# Patient Record
(demographics unavailable — no encounter records)

---

## 2024-12-13 NOTE — HISTORY OF PRESENT ILLNESS
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 Reports on 10/19/2024 she felt a lump on her left side of the neck she saw doctors in [facility] that did 2 biopsies and results came back inconclusive (prsumed due to tumor necrosis) . She then travelled to [place] and met with Bethany Valerio who also did further test which demonstrated cancer. 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  Office bx done  Denies pain, dysphagia,  odynophagia, dysphonia and or dyspnea. weight stable, eating ok. 11/7/2024 left neck mass biopsy Flow cytometry hypocellular specimen 11/27/2024 left tonsil scca with papillary and basaloid features. P16 report pending 11/20/2024 PET/CT scan reporting FDG avid left palatine tonsil mass, estimated 3.ocm. FDG avid ipsilateral left level II and  III lymph nodes. No evidence of hepatic, pulmonary or osseous metastasis Denies cardiac or lung issues. Not on any blood thinners.  non smoker, never. Occasional alcohol intake. no wt loss.  good appetite.  Eats all foods.

## 2024-12-13 NOTE — CONSULT LETTER
[Dear  ___] : Dear  [unfilled], [Consult Letter:] : I had the pleasure of evaluating your patient, [unfilled]. [Please see my note below.] : Please see my note below. [Consult Closing:] : Thank you very much for allowing me to participate in the care of this patient.  If you have any questions, please do not hesitate to contact me. [Sincerely,] : Sincerely, [FreeTextEntry2] : Thien Atkins MD ( [place], [place]) [FreeTextEntry3] : Jv Harmon MD, FACS     [facility] Associate Chair    Department of Otolaryngology  Professor Otolaryngology & Molecular Medicine [facility] of [facility]

## 2024-12-13 NOTE — CONSULT LETTER
[Dear  ___] : Dear  [unfilled], [Consult Letter:] : I had the pleasure of evaluating your patient, [unfilled]. [Please see my note below.] : Please see my note below. [Consult Closing:] : Thank you very much for allowing me to participate in the care of this patient.  If you have any questions, please do not hesitate to contact me. [Sincerely,] : Sincerely, [FreeTextEntry2] : Thien Atkins MD ( [place], [place]) [FreeTextEntry3] : Jv Harmon MD, FACS     [facility] Associate Chair    Department of Otolaryngology  Professor Otolaryngology & Molecular Medicine [facility]

## 2024-12-13 NOTE — HISTORY OF PRESENT ILLNESS
[de-identified] : 76yo female who is being referred by Dr. Atkins for tonsil cancer. Reports on 10/19/2024 she felt a lump on her left side of the neck she saw doctors in [facility] that did 2 biopsies and results came back inconclusive (prsumed due to tumor necrosis) . She then travelled to [place] and met with Bethany Valerio who also did further test which demonstrated cancer. 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  Office bx done  Denies pain, dysphagia,  odynophagia, dysphonia and or dyspnea. weight stable, eating ok. 11/7/2024 left neck mass biopsy Flow cytometry hypocellular specimen 11/27/2024 left tonsil scca with papillary and basaloid features. P16 report pending 11/20/2024 PET/CT scan reporting FDG avid left palatine tonsil mass, estimated 3.ocm. FDG avid ipsilateral left level II and  III lymph nodes. No evidence of hepatic, pulmonary or osseous metastasis Denies cardiac or lung issues. Not on any blood thinners.  non smoker, never. Occasional alcohol intake. no wt loss.  good appetite.  Eats all foods.

## 2024-12-13 NOTE — PHYSICAL EXAM
[Normal] : no rashes [de-identified] : no celar palp mass [FreeTextEntry1] : L tonsillar mass, fairly mobile.  no BOT extension. no clear SP extension.

## 2024-12-13 NOTE — PROCEDURE
[Lesion] : lesion identified by mirror examination needing further evaluation [None] : none [Flexible Endoscope] : examined with the flexible endoscope [Normal] : normal [de-identified] : bot seems clear.  larynx clear

## 2024-12-13 NOTE — PHYSICAL EXAM
[Normal] : no rashes [de-identified] : no celar palp mass [FreeTextEntry1] : L tonsillar mass, fairly mobile.  no BOT extension. no clear SP extension.

## 2024-12-13 NOTE — PHYSICAL EXAM
[Normal] : no rashes [de-identified] : no celar palp mass [FreeTextEntry1] : L tonsillar mass, fairly mobile.  no BOT extension. no clear SP extension.

## 2024-12-13 NOTE — PROCEDURE
[Lesion] : lesion identified by mirror examination needing further evaluation [None] : none [Flexible Endoscope] : examined with the flexible endoscope [Normal] : normal [de-identified] : bot seems clear.  larynx clear

## 2024-12-13 NOTE — PROCEDURE
[Lesion] : lesion identified by mirror examination needing further evaluation [None] : none [Flexible Endoscope] : examined with the flexible endoscope [Normal] : normal [de-identified] : bot seems clear.  larynx clear

## 2024-12-13 NOTE — CONSULT LETTER
[Dear  ___] : Dear  [unfilled], [Consult Letter:] : I had the pleasure of evaluating your patient, [unfilled]. [Please see my note below.] : Please see my note below. [Consult Closing:] : Thank you very much for allowing me to participate in the care of this patient.  If you have any questions, please do not hesitate to contact me. [Sincerely,] : Sincerely, [FreeTextEntry2] : Thien Atkins MD ( [place], [place]) [FreeTextEntry3] : Jv Harmon MD, FACS     [facility] Associate Chair    Department of Otolaryngology  Professor Otolaryngology & Molecular Medicine [facility]

## 2024-12-13 NOTE — HISTORY OF PRESENT ILLNESS
[de-identified] : 76yo female who is being referred by Dr. Atkins for tonsil cancer. Reports on 10/19/2024 she felt a lump on her left side of the neck she saw doctors in [facility] that did 2 biopsies and results came back inconclusive (prsumed due to tumor necrosis) . She then travelled to [place] and met with Bethany Valerio who also did further test which demonstrated cancer. 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  Office bx done  Denies pain, dysphagia,  odynophagia, dysphonia and or dyspnea. weight stable, eating ok. 11/7/2024 left neck mass biopsy Flow cytometry hypocellular specimen 11/27/2024 left tonsil scca with papillary and basaloid features. P16 report pending 11/20/2024 PET/CT scan reporting FDG avid left palatine tonsil mass, estimated 3.ocm. FDG avid ipsilateral left level II and  III lymph nodes. No evidence of hepatic, pulmonary or osseous metastasis Denies cardiac or lung issues. Not on any blood thinners.  non smoker, never. Occasional alcohol intake. no wt loss.  good appetite.  Eats all foods.

## 2024-12-17 NOTE — RESULTS/DATA
[FreeTextEntry1] : - CT neck 10/23/2024: Thick walled essentially cystic or necrotic left neck mass measuring up to 3 cm concerning for pathological stevie conglomerate.  Asymmetric enlargement of the left palatine tonsil.  Images reviewed/interpreted:  - PET/CT 11/20/2024: FDG avid left palatine tonsil mass measuring 3.0 cm. FDG avid ipsilateral left level 2 and 3 lymph nodes. No evidence of distant disease.

## 2024-12-17 NOTE — HISTORY OF PRESENT ILLNESS
[Disease: _____________________] : Disease: [unfilled] [de-identified] : Patient is a non-smoker who is retired and lives in [place].  She noted a lump in the left side of her neck in mid October 2024.  She saw her PCP and was referred to ENT.  CT neck from late October 2024 revealed a left neck mass measuring up to 3 cm concerning for pathological stevie conglomerate with asymmetric enlargement of the left palatine tonsil.  She then underwent a left neck biopsy 11/7/2024 that was nondiagnostic.  Patient came to [place] where her daughter resides to seek medical attention and was evaluated by H&N surgery.  PET/CT from 11/20/2024 revealed hypermetabolic left palatine tonsil mass and ipsilateral cervical lymph node involvement without evidence of distant disease.  Left tonsil biopsy on 11/27/2024 is positive for squamous cell carcinoma.  Patient's case was reviewed and discussed at &N tumor board 12/17/2024 with recommendation for upfront surgical resection with adjuvant therapy based on final pathology.  Patient presents today with her daughter for initial medical oncology consultation. She reports occasional throat soreness.  Denies dysphagia.  She has not altered her diet.  Weight is stable.  No pain or discomfort. 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 IHC for HPV and p16 are pending.

## 2024-12-17 NOTE — ASSESSMENT
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 If p16 positive, patient has clinical stage I disease (T2 N1). If p16 negative, patient has clinical stage Brendan disease (T2 N2b).  Patient's case was reviewed at tumor board with recommendation for upfront TORS followed by adjuvant therapy based on final pathology. Recommend: - Follow-up p16 and HPV testing on her biopsy specimen - Patient to be evaluated by radiation oncology - Plan is for upfront surgical resection.  Adjuvant therapy to be based on final pathology. - Discussed potential inclusion of systemic therapy in the postop setting if indicated based on final pathology in the setting where there were positive margins or extranodal extension.  Also discussed potential administration of chemotherapy concurrent with RT as definitive therapy if surgery were not performed.  Discussed potential treatment with cisplatin 40mg/m2 administered weekly concurrent with RT versus administration of carboplatin/paclitaxel chemotherapy administered weekly if the patient were not a candidate for cisplatin chemotherapy or developed significant toxicity to the cisplatin.  Risk, benefits and side effects of chemotherapy ministration discussed with the patient who signed the consent form; drug information sheets provided.  Again, would administer chemotherapy only if indicated in the proper scenario. - Patient to have blood work performed as part of presurgical testing - Patient can follow-up in the postoperative setting if indicated based on final pathology results All questions answered to their apparent satisfaction

## 2024-12-17 NOTE — REASON FOR VISIT
[Initial Consultation] : an initial consultation [Other: _____] : [unfilled] [FreeTextEntry2] : Tonsil Cancer

## 2024-12-17 NOTE — PHYSICAL EXAM
[Fully active, able to carry on all pre-disease performance without restriction] : Status 0 - Fully active, able to carry on all pre-disease performance without restriction [Normal] : affect appropriate [de-identified] : No icterus  [de-identified] : Left tonsilar mass [de-identified] : Shotty left cervical LAD  [de-identified] : Clear [de-identified] : S1 S2 [de-identified] : No edema  [de-identified] : No spine/CVA tenderness  [de-identified] : Ambulatory

## 2024-12-17 NOTE — HISTORY OF PRESENT ILLNESS
[Disease: _____________________] : Disease: [unfilled] [de-identified] : Patient is a non-smoker who is retired and lives in [place].  She noted a lump in the left side of her neck in mid October 2024.  She saw her PCP and was referred to ENT.  CT neck from late October 2024 revealed a left neck mass measuring up to 3 cm concerning for pathological stevie conglomerate with asymmetric enlargement of the left palatine tonsil.  She then underwent a left neck biopsy 11/7/2024 that was nondiagnostic.  Patient came to [place] where her daughter resides to seek medical attention and was evaluated by H&N surgery.  PET/CT from 11/20/2024 revealed hypermetabolic left palatine tonsil mass and ipsilateral cervical lymph node involvement without evidence of distant disease.  Left tonsil biopsy on 11/27/2024 is positive for squamous cell carcinoma.  Patient's case was reviewed and discussed at &N tumor board 12/17/2024 with recommendation for upfront surgical resection with adjuvant therapy based on final pathology.  Patient presents today with her daughter for initial medical oncology consultation. She reports occasional throat soreness.  Denies dysphagia.  She has not altered her diet.  Weight is stable.  No pain or discomfort. 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 IHC for HPV and p16 are pending.

## 2024-12-17 NOTE — PHYSICAL EXAM
[Fully active, able to carry on all pre-disease performance without restriction] : Status 0 - Fully active, able to carry on all pre-disease performance without restriction [Normal] : affect appropriate [de-identified] : No icterus  [de-identified] : Left tonsilar mass [de-identified] : Shotty left cervical LAD  [de-identified] : Clear [de-identified] : S1 S2 [de-identified] : No edema  [de-identified] : No spine/CVA tenderness  [de-identified] : Ambulatory

## 2025-01-09 NOTE — REVIEW OF SYSTEMS
[Recent Change In Weight] : ~T recent weight change [Negative] : Psychiatric [Fever] : no fever [Chills] : no chills [Night Sweats] : no night sweats [Fatigue] : no fatigue [Proptosis] : no proptosis [Muscle Weakness] : no muscle weakness [FreeTextEntry2] : Lost 4 pounds in 2 weeks

## 2025-01-09 NOTE — HISTORY OF PRESENT ILLNESS
[FreeTextEntry1] : 77 year old female with newly diagnosed left tonsil SCC referred here to discuss about radiation therapy options.  Patient is a non-smoker who is retired and lives in [place], but has been staying with her daughter who lives in [place]. She noted a lump in the left side of her neck in mid October 2024. She saw her PCP and was referred to ENT. CT neck from late October 2024 revealed a left neck mass measuring up to 3 cm concerning for pathological stevie conglomerate with asymmetric enlargement of the left palatine tonsil. She then underwent a left neck biopsy 11/7/2024 that was nondiagnostic. Patient came to [place] where her daughter resides to seek medical attention and was evaluated by H&N surgery. The patient saw Dr raphael and underwent biopsy and PET CT.  PET/CT 11/20/2024: FDG avid palatine tonsil mass 3.0cm. FDG avid ipsilateral level II, III LN. no evidence of any other mets.  Biopsy 11/27/2024: SCC with papillary and basaloid features.  HPV 6/11: negative. P16 and HPV 16/18/33: positive  CT head 10/23/2024: Thick walled centrally cystic or necrotic left neck mass 3cm concerning for pathologic stevie conglomerate. Asymmetric enlargement of the left palatine tonsil without evidence of a discrete measurable mass.  Patient met Dr Adrian and discussed about post op chemotherapy. Surgery is planned for 01/15/2025.  Patient's case was reviewed and discussed at H&N tumor board 12/17/2024 with recommendation for upfront surgical resection with adjuvant therapy based on final pathology.  On seeing her today, she is with her daughter. She has no specific complaints but noticed 4 pounds weight loss in 2 weeks and she brings up a lot of phlegm early in the morning. She is active at home, no recent fever, cough, chest pain, SOB or body pain. She is anon smoker but drinks alcohol socially.

## 2025-01-09 NOTE — HISTORY OF PRESENT ILLNESS
[FreeTextEntry1] : 77 year old female with newly diagnosed left tonsil SCC referred here to discuss about radiation therapy options.  Patient is a non-smoker who is retired and lives in [place], but has been staying with her daughter who lives in [place]. She noted a lump in the left side of her neck in mid October 2024. She saw her PCP and was referred to ENT. CT neck from late October 2024 revealed a left neck mass measuring up to 3 cm concerning for pathological stevie conglomerate with asymmetric enlargement of the left palatine tonsil. She then underwent a left neck biopsy 11/7/2024 that was nondiagnostic. Patient came to [place] where her daughter resides to seek medical attention and was evaluated by H&N surgery. The patient saw Dr raphael and underwent biopsy and PET CT.  PET/CT 11/20/2024: FDG avid palatine tonsil mass 3.0cm. FDG avid ipsilateral level II, III LN. no evidence of any other mets.  Biopsy 11/27/2024: SCC with papillary and basaloid features.  HPV 6/11: negative. P16 and HPV 16/18/33: positive  CT head 10/23/2024: Thick walled centrally cystic or necrotic left neck mass 3cm concerning for pathologic stevie conglomerate. Asymmetric enlargement of the left palatine tonsil without evidence of a discrete measurable mass.  Patient met Dr Adrian and discussed about post op chemotherapy. Surgery is planned for 01/15/2025.  Patient's case was reviewed and discussed at H&N tumor board 12/17/2024 with recommendation for upfront surgical resection with adjuvant therapy based on final pathology.  On seeing her today, she is with her daughter. She has no specific complaints but noticed 4 pounds weight loss in 2 weeks and she brings up a lot of phlegm early in the morning. She is active at home, no recent fever, cough, chest pain, SOB or body pain. She is anon smoker but drinks alcohol socially.

## 2025-01-09 NOTE — PHYSICAL EXAM
[General Appearance - Well Developed] : well developed [General Appearance - Well Nourished] : well nourished [Sclera] : the sclera and conjunctiva were normal [Outer Ear] : the ears and nose were normal in appearance [Heart Sounds] : normal S1 and S2 [No Focal Deficits] : no focal deficits [Oriented To Time, Place, And Person] : oriented to person, place, and time [Exaggerated Use Of Accessory Muscles For Inspiration] : no accessory muscle use [Arterial Pulses Normal] : the arterial pulses were normal [Abdomen Soft] : soft [Nondistended] : nondistended [Normal] : normal spine exam without palpable tenderness, no kyphosis or scoliosis [Musculoskeletal - Swelling] : no joint swelling [Nail Clubbing] : no clubbing  or cyanosis of the fingernails [Skin Color & Pigmentation] : normal skin color and pigmentation [] : no rash [Sensation] : the sensory exam was normal to light touch and pinprick [Affect] : the affect was normal [de-identified] : Visible left tonsillar mass approaching but not clearly involving SP or BOT [de-identified] : palpable left level II lymphadenopathy

## 2025-01-09 NOTE — END OF VISIT
[] : Resident [FreeTextEntry3] : We had an extensive discussion of the patient's imaging, labs, and pathology, and reviewed them together; I independently evaluated these and discussed their significance with the patient and family. I have also been involved in the multidisciplinary discussion of her care with her other providers. We discussed the natural history of oropharynx cancer and its management. [Time Spent: ___ minutes] : I have spent [unfilled] minutes of time on the encounter which excludes teaching and separately reported services.

## 2025-01-09 NOTE — PHYSICAL EXAM
[General Appearance - Well Developed] : well developed [General Appearance - Well Nourished] : well nourished [Sclera] : the sclera and conjunctiva were normal [Outer Ear] : the ears and nose were normal in appearance [Heart Sounds] : normal S1 and S2 [No Focal Deficits] : no focal deficits [Oriented To Time, Place, And Person] : oriented to person, place, and time [Exaggerated Use Of Accessory Muscles For Inspiration] : no accessory muscle use [Arterial Pulses Normal] : the arterial pulses were normal [Abdomen Soft] : soft [Nondistended] : nondistended [Normal] : normal spine exam without palpable tenderness, no kyphosis or scoliosis [Musculoskeletal - Swelling] : no joint swelling [Nail Clubbing] : no clubbing  or cyanosis of the fingernails [Skin Color & Pigmentation] : normal skin color and pigmentation [] : no rash [Affect] : the affect was normal [Sensation] : the sensory exam was normal to light touch and pinprick [de-identified] : Visible left tonsillar mass approaching but not clearly involving SP or BOT [de-identified] : palpable left level II lymphadenopathy

## 2025-01-09 NOTE — VITALS
[Maximal Pain Intensity: 0/10] : 0/10 [Least Pain Intensity: 0/10] : 0/10 [90: Able to carry normal activity; minor signs or symptoms of disease.] : 90: Able to carry normal activity; minor signs or symptoms of disease.  [ECOG Performance Status: 0 - Fully active, able to carry on all pre-disease performance without restriction] : Performance Status: 0 - Fully active, able to carry on all pre-disease performance without restriction [Date: ____________] : Patient's last distress assessment performed on [unfilled]. [2 - Distress Level] : Distress Level: 2 [ECOG Performance Status: 1 - Restricted in physically strenuous activity but ambulatory and able to carry out work of a light or sedentary nature] : Performance Status: 1 - Restricted in physically strenuous activity but ambulatory and able to carry out work of a light or sedentary nature, e.g., light house work, office work

## 2025-01-27 NOTE — HISTORY OF PRESENT ILLNESS
[de-identified] : Pt is tongue cancer and S/P robotic Tonsillectomy with buccal fat graft and left neck dissection on1/15/2025 and path  Squamous or carcinoma, predominantly non-keratinizing,  Carcinoma focally present on cauterized inferior margin, 1 out of 16 lymph nodes positive for metastatic carcinoma Pt is here for post op f/u and doing ok, and  lot of saliva, hard to swallow  no pain,  eating soft diet.

## 2025-01-27 NOTE — HISTORY OF PRESENT ILLNESS
[de-identified] : Pt is tongue cancer and S/P robotic Tonsillectomy with buccal fat graft and left neck dissection on1/15/2025 and path  Squamous or carcinoma, predominantly non-keratinizing,  Carcinoma focally present on cauterized inferior margin, 1 out of 16 lymph nodes positive for metastatic carcinoma Pt is here for post op f/u and doing ok, and  lot of saliva, hard to swallow  no pain,  eating soft diet.

## 2025-02-07 NOTE — HISTORY OF PRESENT ILLNESS
[de-identified] : Pt is tongue cancer and S/P robotic Tonsillectomy with buccal fat graft and left neck dissection on1/15/2025 and path  Squamous or carcinoma, predominantly non-keratinizing,  Carcinoma focally present on cauterized inferior margin, 1 out of 16 lymph nodes positive for metastatic carcinoma.  Pt is here for post op f/u and doing ok, and lot of saliva, hard to swallow  Has no appetite--lost 8 lbs since 1/27/25.  eating soft diet.  Someone in house had walking pneumonia--now has cough that is keeping her awake.  Coughing up white thick mucus.  Denies dyspnea, dysphagia, odynophagia, fevers.

## 2025-02-07 NOTE — REASON FOR VISIT
[Subsequent Evaluation] : a subsequent evaluation for [Family Member] : family member [FreeTextEntry2] : post op f/u

## 2025-02-10 NOTE — REASON FOR VISIT
[Routine Follow-Up] : routine follow-up visit for [Family Member] : family member [Other: _____] : [unfilled]

## 2025-02-10 NOTE — REVIEW OF SYSTEMS
[Cough] : cough [SOB on Exertion] : shortness of breath during exertion [Diarrhea] : diarrhea [Negative] : Heme/Lymph [Dysphagia] : dysphagia [Loss of Hearing] : no loss of hearing [Nosebleeds] : no nosebleeds [Hearing Disturbances] : no hearing disturbances [Shortness Of Breath] : no shortness of breath [Wheezing] : no wheezing

## 2025-02-10 NOTE — HISTORY OF PRESENT ILLNESS
[FreeTextEntry1] : Ms. 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 s/p Tonsillectomy with buccal fat graft and left neck dissection on1/15/2025, path Squamous or carcinoma, predominantly non-keratinizing, Carcinoma focally present on cauterized inferior margin, 1 out of 16 lymph nodes positive for metastatic carcinoma.  2/10/2025 She returns to discuss adjuvant radiation therapy. Tolerating po intake but mostly soft solids due to discomfort with swallowing. Left neck surgical suture is healing well, reports neck tightness. Will request appt with [facility] for clearance She has ongoing cough, started on Clindamycin by Dr Ibarra on Friday.

## 2025-02-10 NOTE — END OF VISIT
[FreeTextEntry3] : We had an extensive discussion of the patient's imaging, labs, and pathology, and reviewed them together; I independently evaluated these and discussed their significance with the patient and family. I have also been involved in the multidisciplinary discussion of her care with her other providers. We discussed the natural history of oropharynx cancer and its management. [Time Spent: ___ minutes] : I have spent [unfilled] minutes of time on the encounter which excludes teaching and separately reported services.

## 2025-02-10 NOTE — PHYSICAL EXAM
[Abdomen Soft] : soft [Abdomen Tenderness] : non-tender [Musculoskeletal - Swelling] : no joint swelling [Skin Color & Pigmentation] : normal skin color and pigmentation [No Focal Deficits] : no focal deficits [Oriented To Time, Place, And Person] : oriented to person, place, and time [Exaggerated Use Of Accessory Muscles For Inspiration] : no accessory muscle use [Heart Rate And Rhythm] : heart rate and rhythm were normal [Arterial Pulses Normal] : the arterial pulses were normal [Normal] : normal spine exam without palpable tenderness, no kyphosis or scoliosis [] : no rash [Sensation] : the sensory exam was normal to light touch and pinprick [Affect] : the affect was normal [de-identified] : s/p left neck dissection. Surgical scar healing well [de-identified] : Lung sound diminished at the bases, no wheezing, rales or crackles

## 2025-03-04 NOTE — PHYSICAL EXAM
[Normal] : no rashes [Laryngoscopy Performed] : laryngoscopy was performed, see procedure section for findings [de-identified] : Incision healing well, no LAD. [FreeTextEntry1] : Pharyngectomy site healing well.  No concerning lesions in the OC/OPx on inspection or palpation.

## 2025-03-04 NOTE — HISTORY OF PRESENT ILLNESS
[de-identified] : Pt is tongue cancer and S/P robotic Tonsillectomy with buccal fat graft and left neck dissection on1/15/2025 and path  Squamous or carcinoma, predominantly non-keratinizing,  Carcinoma focally present on cauterized inferior margin, 1 out of 16 lymph nodes positive for metastatic carcinoma. Pt is here for f/u and over all better and saw Dr. Lewis for RT and dental cleared and start RT 3/12/2025. pt completed doxy and feel better with the coughing. Denies dyspnea, dysphagia, odynophagia, fevers.

## 2025-03-04 NOTE — REASON FOR VISIT
[Subsequent Evaluation] : a subsequent evaluation for [Family Member] : family member [FreeTextEntry2] : f/u tongue cancer

## 2025-03-04 NOTE — PROCEDURE
[Gag Reflex] : gag reflex preventing mirror examination [None] : none [Flexible Endoscope] : examined with the flexible endoscope [Serial Number: ___] : Serial Number: [unfilled] [de-identified] : After patient consents, a FFL is performed.  No lesions in the NPx, HPx or larynx.  Exam of the OPx with well healing surgical site, well mucosalized.  VC are mobile, airway patent.

## 2025-03-18 NOTE — DISEASE MANAGEMENT
[Clinical] : TNM Stage: c [I] : I [TTNM] : 2 [NTNM] : 2B [MTNM] : 0 [de-identified] : 955 [de-identified] : 6000cGy [de-identified] : Oropharynx/Left neck

## 2025-03-18 NOTE — HISTORY OF PRESENT ILLNESS
[FreeTextEntry1] : Ms. Villalobos is a 78 year old F with Stage I [id number] SCC of left palatine tonsil (p16+/HPV+) s/p TORs and ipsilateral neck dissection on 1/15/25. Pathology yielded SCC, 3.0 cm in left tonsil, focally present on inferior margin but supplemental margin negative, 2/28 lymph nodes (largest measuring 1.2cm), -LVSI, -PNI.  3/18/2025 She presents for on treatment visit. Completed 3/30 fractions. Reviewed symptom management, oral and skin care

## 2025-03-18 NOTE — HISTORY OF PRESENT ILLNESS
[FreeTextEntry1] : Ms. 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 Pathology yielded SCC, 3.0 cm in left tonsil, focally present on inferior margin but supplemental margin negative, 2/28 lymph nodes (largest measuring 1.2cm), -LVSI, -PNI.  3/18/2025 She presents for on treatment visit. Completed 3/30 fractions. Reviewed symptom management, oral and skin care

## 2025-03-18 NOTE — DISEASE MANAGEMENT
[Clinical] : TNM Stage: c [I] : I [TTNM] : 2 [NTNM] : 2B [MTNM] : 0 [de-identified] : 050 [de-identified] : 6000cGy [de-identified] : Oropharynx/Left neck

## 2025-03-25 NOTE — HISTORY OF PRESENT ILLNESS
[FreeTextEntry1] : Ms. Villalobos is a 78 year old F with Stage I [id number] SCC of left palatine tonsil (p16+/HPV+) s/p TORs and ipsilateral neck dissection on 1/15/25. Pathology yielded SCC, 3.0 cm in left tonsil, focally present on inferior margin but supplemental margin negative, 2/28 lymph nodes (largest measuring 1.2cm), -LVSI, -PNI.  3/18/2025 She presents for on treatment visit. Completed 3/30 fractions. Reviewed symptom management, oral and skin care  3/25/2025 She presents for on treatment visit. Completed 8/30 fractions. She has ongoing pretreatment soft stools. Encouraged on diet modification

## 2025-03-25 NOTE — VITALS
[80: Normal activity with effort; some signs or symptoms of disease.] : 80: Normal activity with effort; some signs or symptoms of disease.  [ECOG Performance Status: 1 - Restricted in physically strenuous activity but ambulatory and able to carry out work of a light or sedentary nature] : Performance Status: 1 - Restricted in physically strenuous activity but ambulatory and able to carry out work of a light or sedentary nature, e.g., light house work, office work [Maximal Pain Intensity: 0/10] : 0/10

## 2025-03-25 NOTE — DISEASE MANAGEMENT
[Clinical] : TNM Stage: c [I] : I [TTNM] : 2 [NTNM] : 2B [MTNM] : 0 [de-identified] : 5100 [de-identified] : 6000cGy [de-identified] : Oropharynx/Left neck

## 2025-03-25 NOTE — HISTORY OF PRESENT ILLNESS
[FreeTextEntry1] : Ms. Villalobos is a 78 year old F with Stage I [id number] SCC of left palatine tonsil (p16+/HPV+) s/p TORs and ipsilateral neck dissection on 1/15/25. Pathology yielded SCC, 3.0 cm in left tonsil, focally present on inferior margin but supplemental margin negative, 2/28 lymph nodes (largest measuring 1.2cm), -LVSI, -PNI.  3/18/2025 She presents for on treatment visit. Completed 3/30 fractions. Reviewed symptom management, oral and skin care  3/25/2025 She presents for on treatment visit. Completed 8/30 fractions. She has ongoing pretreatment soft stools. Encouraged on diet modification

## 2025-03-25 NOTE — DISEASE MANAGEMENT
[Clinical] : TNM Stage: c [I] : I [TTNM] : 2 [NTNM] : 2B [MTNM] : 0 [de-identified] : 4273 [de-identified] : 6000cGy [de-identified] : Oropharynx/Left neck

## 2025-04-01 NOTE — DISEASE MANAGEMENT
[Clinical] : TNM Stage: c [I] : I [TTNM] : 2 [NTNM] : 2B [MTNM] : 0 [de-identified] : 9951 [de-identified] : 6000cGy [de-identified] : Oropharynx/Left neck

## 2025-04-01 NOTE — HISTORY OF PRESENT ILLNESS
[FreeTextEntry1] : Ms. Villalobos is a 78 year old F with Stage I [id number] SCC of left palatine tonsil (p16+/HPV+) s/p TORs and ipsilateral neck dissection on 1/15/25. Pathology yielded SCC, 3.0 cm in left tonsil, focally present on inferior margin but supplemental margin negative, 2/28 lymph nodes (largest measuring 1.2cm), -LVSI, -PNI.  3/18/2025 She presents for on treatment visit. Completed 3/30 fractions. Reviewed symptom management, oral and skin care  3/25/2025 She presents for on treatment visit. Completed 8/30 fractions. She has ongoing pretreatment soft stools. Encouraged on diet modification  4/1/2025 She presents for on treatment visit. Completed 14/30 fractions Reports ongoing frequent stooling is worse, weight loss 7lbs/week, seeing GI on Thursday. Trial Ensure clear, add IV hydration/Getorade Denies sore throat, dysphagia, oral pain. Encouraged to use biotene for dry mouth. Left neck redness, using Aquaphor.

## 2025-04-01 NOTE — REVIEW OF SYSTEMS
[Fatigue: Grade 1 - Fatigue relieved by rest] : Fatigue: Grade 1 - Fatigue relieved by rest [Xerostomia: Grade 1 - Symptomatic (e.g., dry or thick saliva) without significant dietary alteration; unstimulated saliva flow >0.2 ml/min] : Xerostomia: Grade 1 - Symptomatic (e.g., dry or thick saliva) without significant dietary alteration; unstimulated saliva flow >0.2 ml/min [Dermatitis Radiation: Grade 1 - Faint erythema or dry desquamation] : Dermatitis Radiation: Grade 1 - Faint erythema or dry desquamation [FreeTextEntry3] : loose stools

## 2025-04-01 NOTE — DISEASE MANAGEMENT
[Clinical] : TNM Stage: c [I] : I [TTNM] : 2 [NTNM] : 2B [MTNM] : 0 [de-identified] : 0638 [de-identified] : 6000cGy [de-identified] : Oropharynx/Left neck

## 2025-04-01 NOTE — HISTORY OF PRESENT ILLNESS
[FreeTextEntry1] : Ms. Villalobos is a 78 year old F with Stage I [id number] SCC of left palatine tonsil (p16+/HPV+) s/p TORs and ipsilateral neck dissection on 1/15/25. Pathology yielded SCC, 3.0 cm in left tonsil, focally present on inferior margin but supplemental margin negative, 2/28 lymph nodes (largest measuring 1.2cm), -LVSI, -PNI.  3/18/2025 She presents for on treatment visit. Completed 3/30 fractions. Reviewed symptom management, oral and skin care  3/25/2025 She presents for on treatment visit. Completed 8/30 fractions. She has ongoing pretreatment soft stools. Encouraged on diet modification  4/1/2025 She presents for on treatment visit. Completed 14/30 fractions Reports ongoing frequent stooling is worse, weight loss 7lbs/week, seeing GI on Thursday. Trial Ensure clear, add IV hydration/Getorade Denies sore throat, dysphagia, oral pain. Encouraged to use biotene for dry mouth. Left neck redness, using Aquaphor.

## 2025-04-15 NOTE — REVIEW OF SYSTEMS
[Fatigue: Grade 1 - Fatigue relieved by rest] : Fatigue: Grade 1 - Fatigue relieved by rest [Xerostomia: Grade 1 - Symptomatic (e.g., dry or thick saliva) without significant dietary alteration; unstimulated saliva flow >0.2 ml/min] : Xerostomia: Grade 1 - Symptomatic (e.g., dry or thick saliva) without significant dietary alteration; unstimulated saliva flow >0.2 ml/min [Salivary duct inflammation: Grade 1 - Slightly thickened saliva; slightly altered taste (e.g., metallic)] : Salivary duct inflammation: Grade 1 - Slightly thickened saliva; slightly altered taste (e.g., metallic) [Dermatitis Radiation: Grade 1 - Faint erythema or dry desquamation] : Dermatitis Radiation: Grade 1 - Faint erythema or dry desquamation [Mucositis Oral: Grade 1 - Asymptomatic or mild symptoms; intervention not indicated] : Mucositis Oral: Grade 1 - Asymptomatic or mild symptoms; intervention not indicated [FreeTextEntry3] : loose stools

## 2025-04-15 NOTE — VITALS
[80: Normal activity with effort; some signs or symptoms of disease.] : 80: Normal activity with effort; some signs or symptoms of disease.  [ECOG Performance Status: 1 - Restricted in physically strenuous activity but ambulatory and able to carry out work of a light or sedentary nature] : Performance Status: 1 - Restricted in physically strenuous activity but ambulatory and able to carry out work of a light or sedentary nature, e.g., light house work, office work [Maximal Pain Intensity: 5/10] : 5/10 [Pain Location: ___] : Pain Location: [unfilled]

## 2025-04-15 NOTE — DISEASE MANAGEMENT
[Clinical] : TNM Stage: c [I] : I [TTNM] : 2 [NTNM] : 2B [MTNM] : 0 [de-identified] : 2166 [de-identified] : 6000cGy [de-identified] : Oropharynx/Left neck

## 2025-04-15 NOTE — HISTORY OF PRESENT ILLNESS
[FreeTextEntry1] : Ms. Villalobos is a 78 year old F with Stage I [id number] SCC of left palatine tonsil (p16+/HPV+) s/p TORs and ipsilateral neck dissection on 1/15/25. Pathology yielded SCC, 3.0 cm in left tonsil, focally present on inferior margin but supplemental margin negative, 2/28 lymph nodes (largest measuring 1.2cm), -LVSI, -PNI.  3/18/2025 She presents for on treatment visit. Completed 3/30 fractions. Reviewed symptom management, oral and skin care  3/25/2025 She presents for on treatment visit. Completed 8/30 fractions. She has ongoing pretreatment soft stools. Encouraged on diet modification  4/1/2025 She presents for on treatment visit. Completed 14/30 fractions Reports ongoing frequent stooling is worse, weight loss 7lbs/week, seeing GI on Thursday. Trial Ensure clear, add IV hydration/Getorade Denies sore throat, dysphagia, oral pain. Encouraged to use biotene for dry mouth. Left neck redness, using Aquaphor.  4/8/2025 She presents for on treatment visit. Completed 19/30 fractions She was evaluated by GI for frequent stooling. Started on IBGARD. She has ongoing fatigue, mild phlegm in throat. Reinforced Baking soda oral rinse Moderate fatigue and chronic diarrhea- obtain CBC and CMP    4/15/2025 She presents for on treatment visit. Completed 24/30 fractions Started on fidaxomicin for Cdiff. Follows Dr West GI @ [facility] Started on K+ replacement for hypokalemia She has c/o excess phlegm, encouraged to continue on oral rinses, add Mucinex

## 2025-04-22 NOTE — REASON FOR VISIT
[Routine On-Treatment] : a routine on-treatment visit for [Breast Cancer] : breast cancer [Family Member] : family member [Other: _____] : [unfilled]

## 2025-04-22 NOTE — VITALS
[Pain Location: ___] : Pain Location: [unfilled] [80: Normal activity with effort; some signs or symptoms of disease.] : 80: Normal activity with effort; some signs or symptoms of disease.  [ECOG Performance Status: 1 - Restricted in physically strenuous activity but ambulatory and able to carry out work of a light or sedentary nature] : Performance Status: 1 - Restricted in physically strenuous activity but ambulatory and able to carry out work of a light or sedentary nature, e.g., light house work, office work [Maximal Pain Intensity: 7/10] : 7/10

## 2025-04-23 NOTE — DISEASE MANAGEMENT
[Clinical] : TNM Stage: c [I] : I [TTNM] : 2 [NTNM] : 2B [MTNM] : 0 [de-identified] : 4925 [de-identified] : 6000cGy [de-identified] : Oropharynx/Left neck

## 2025-04-23 NOTE — REVIEW OF SYSTEMS
[Fatigue: Grade 1 - Fatigue relieved by rest] : Fatigue: Grade 1 - Fatigue relieved by rest [Mucositis Oral: Grade 1 - Asymptomatic or mild symptoms; intervention not indicated] : Mucositis Oral: Grade 1 - Asymptomatic or mild symptoms; intervention not indicated [Xerostomia: Grade 1 - Symptomatic (e.g., dry or thick saliva) without significant dietary alteration; unstimulated saliva flow >0.2 ml/min] : Xerostomia: Grade 1 - Symptomatic (e.g., dry or thick saliva) without significant dietary alteration; unstimulated saliva flow >0.2 ml/min [Salivary duct inflammation: Grade 1 - Slightly thickened saliva; slightly altered taste (e.g., metallic)] : Salivary duct inflammation: Grade 1 - Slightly thickened saliva; slightly altered taste (e.g., metallic) [Nausea: Grade 2 - Oral intake decreased without significant weight loss, dehydration or malnutrition] : Nausea: Grade 2 - Oral intake decreased without significant weight loss, dehydration or malnutrition [Vomiting: Grade 2 - 3 - 5 episodes ( by 5 minutes) in 24 hrs] : Vomiting: Grade 2 - 3 - 5 episodes ( by 5 minutes) in 24 hrs [Dermatitis Radiation: Grade 2 - Moderate to brisk erythema; patchy moist desquamation, mostly confined to skin folds and creases; moderate edema] : Dermatitis Radiation: Grade 2 - Moderate to brisk erythema; patchy moist desquamation, mostly confined to skin folds and creases; moderate edema [FreeTextEntry3] : loose stools [Dysgeusia: Grade 1- Altered taste but no change in diet] : Dysgeusia: Grade 1 - Altered taste but no change in diet

## 2025-04-23 NOTE — HISTORY OF PRESENT ILLNESS
[FreeTextEntry1] : Ms. Villalobos is a 78 year old F with Stage I [id number] SCC of left palatine tonsil (p16+/HPV+) s/p TORs and ipsilateral neck dissection on 1/15/25. Pathology yielded SCC, 3.0 cm in left tonsil, focally present on inferior margin but supplemental margin negative, 2/28 lymph nodes (largest measuring 1.2cm), -LVSI, -PNI.  3/18/2025 She presents for on treatment visit. Completed 3/30 fractions. Reviewed symptom management, oral and skin care  3/25/2025 She presents for on treatment visit. Completed 8/30 fractions. She has ongoing pretreatment soft stools. Encouraged on diet modification  4/1/2025 She presents for on treatment visit. Completed 14/30 fractions Reports ongoing frequent stooling is worse, weight loss 7lbs/week, seeing GI on Thursday. Trial Ensure clear, add IV hydration/Getorade Denies sore throat, dysphagia, oral pain. Encouraged to use biotene for dry mouth. Left neck redness, using Aquaphor.  4/8/2025 She presents for on treatment visit. Completed 19/30 fractions She was evaluated by GI for frequent stooling. Started on IBGARD. She has ongoing fatigue, mild phlegm in throat. Reinforced Baking soda oral rinse Moderate fatigue and chronic diarrhea- obtain CBC and CMP   4/15/2025 She presents for on treatment visit. Completed 24/30 fractions Started on fidaxomicin for Cdiff. Follows Dr West GI @ [facility] Started on K+ replacement for hypokalemia She has c/o excess phlegm, encouraged to continue on oral rinses, add Mucinex  4/22/2025 She presents for on treatment visit. Completed 29/30 fractions She presented with moderate fatigue, sore throat, painful oral sores. Reports ongoing nausea and vomiting, denies bloody emesis. Her daughter states patient has not been able to hold food down for some days now, noted 6 lbs weight loss/1 week Left neck with dry desquamation Ongoing antibiotics for CDiff, did not take her medications today as she is not tolerating po intake ER on demand requested for possible IVF hydration and IV antiemetics.

## 2025-04-23 NOTE — DISEASE MANAGEMENT
[Clinical] : TNM Stage: c [I] : I [TTNM] : 2 [NTNM] : 2B [MTNM] : 0 [de-identified] : 3959 [de-identified] : 6000cGy [de-identified] : Oropharynx/Left neck

## 2025-04-23 NOTE — HISTORY OF PRESENT ILLNESS
[FreeTextEntry1] : Ms. Villalobos is a 78 year old F with Stage I [id number] SCC of left palatine tonsil (p16+/HPV+) s/p TORs and ipsilateral neck dissection on 1/15/25. Pathology yielded SCC, 3.0 cm in left tonsil, focally present on inferior margin but supplemental margin negative, 2/28 lymph nodes (largest measuring 1.2cm), -LVSI, -PNI.  3/18/2025 She presents for on treatment visit. Completed 3/30 fractions. Reviewed symptom management, oral and skin care  3/25/2025 She presents for on treatment visit. Completed 8/30 fractions. She has ongoing pretreatment soft stools. Encouraged on diet modification  4/1/2025 She presents for on treatment visit. Completed 14/30 fractions Reports ongoing frequent stooling is worse, weight loss 7lbs/week, seeing GI on Thursday. Trial Ensure clear, add IV hydration/Getorade Denies sore throat, dysphagia, oral pain. Encouraged to use biotene for dry mouth. Left neck redness, using Aquaphor.  4/8/2025 She presents for on treatment visit. Completed 19/30 fractions She was evaluated by GI for frequent stooling. Started on IBGARD. She has ongoing fatigue, mild phlegm in throat. Reinforced Baking soda oral rinse Moderate fatigue and chronic diarrhea- obtain CBC and CMP   4/15/2025 She presents for on treatment visit. Completed 24/30 fractions Started on fidaxomicin for Cdiff. Follows Dr West GI @ [facility] Started on K+ replacement for hypokalemia She has c/o excess phlegm, encouraged to continue on oral rinses, add Mucinex  4/22/2025 She presents for on treatment visit. Completed 29/30 fractions She presented with moderate fatigue, sore throat, painful oral sores. Reports ongoing nausea and vomiting, denies bloody emesis. Her daughter states patient has not been able to hold food down for some days now, noted 6 lbs weight loss/1 week Left neck with dry desquamation Ongoing antibiotics for CDiff, did not take her medications today as she is not tolerating po intake ER on demand requested for possible IVF hydration and IV antiemetics.

## 2025-04-29 NOTE — PHYSICAL EXAM
[Normal] : no rashes [Laryngoscopy Performed] : laryngoscopy was performed, see procedure section for findings [de-identified] : Incision healing well, no LAD.  Posttreatment changes, moderate dermatitis. [FreeTextEntry1] : Pharyngectomy site healing well.  No concerning lesions in the OC/OPx on inspection or palpation.  Moderate mucositis.

## 2025-04-29 NOTE — REASON FOR VISIT
[Subsequent Evaluation] : a subsequent evaluation for [Family Member] : family member [FreeTextEntry2] : left tonsil SCCa

## 2025-04-29 NOTE — PROCEDURE
[Gag Reflex] : gag reflex preventing mirror examination [None] : none [Flexible Endoscope] : examined with the flexible endoscope [Serial Number: ___] : Serial Number: [unfilled] [de-identified] : After patient consents, a FFL is performed.  No lesions in the NPx, OPx, HPx or larynx.  Expected posttreatment changes, VC are mobile, airway patent.  Moderate mucositis.

## 2025-04-29 NOTE — HISTORY OF PRESENT ILLNESS
[de-identified] : 78 year old female with hx of left tonsil SCCa s/p robotic Tonsillectomy with buccal fat graft and left neck dissection on 1/15/2025 and path showed Squamous or carcinoma, predominantly non-keratinizing,  Carcinoma focally present on cauterized inferior margin, 1 out of 16 lymph nodes positive for metastatic carcinoma. Presents for follow up evaluation. Started RT 3/12/2025 completed last week. Notes irritation / discomfort to radiation site.  Denies dyspnea, dysphagia, odynophagia, fevers. Reports 44 pound weight loss during the course of treatment. Occasional difficulty with swallowing both solid and liquids. States supplementing with shakes. Denies dyspnea, odynophagia, hemoptysis.

## 2025-05-03 NOTE — ASSESSMENT
[FreeTextEntry1] : nausea, vomiting fatigue, decreased PO intake after radiation therapy, feel tired

## 2025-05-03 NOTE — HISTORY OF PRESENT ILLNESS
[Other Location: e.g. School (Enter Location, City,State)___] : at [unfilled], at the time of the visit. [Other Location: e.g. Home (Enter Location, City,State)___] : at [unfilled] [Telehealth (audio & video)] : This visit was provided via telehealth using real-time 2-way audio visual technology. [Verbal consent obtained from patient] : the patient, [unfilled] [FreeTextEntry8] : CP Mustapha Daughter Alejandra 79 yo female tonsillar cancer s/p surgery, now on RT (completed 29/30 session today, scheduled for 30th session tomorrow) for evaluation fatigue, nausea, vomiting, decreased PO intake. Is currently on oral vancomycin for treatment on c.dif. Bloodwork from 4/8/25 showed potassium level low at 3.0. Has been taking zofran with some improvement of symptoms. Denies chest symptoms or shortness of breath.  Dr. Rosa Lewis - radiation oncologist - recommended trying CP intervention at home to avoid hospitalization.   PMH: hypothyroid,  thyroid cancer (partial thyroidectomy), HTN, HLD Address: [address] [place] [address] HR: 68 BP: 140/73  RR: 12-14 O2: 95% on RA Temp: 98.3 EKG: sinus

## 2025-05-03 NOTE — PLAN
[FreeTextEntry1] : Intervention: oral potassium crushed into liquid solution, IVF, zofran -Take Tylenol or Motrin as discussed for pain management -f/u with oncology team -Red flag symptoms for immediate ER evaluation discussed. pt expresses full understanding and agrees.

## 2025-05-03 NOTE — HISTORY OF PRESENT ILLNESS
[Other Location: e.g. School (Enter Location, City,State)___] : at [unfilled], at the time of the visit. [Other Location: e.g. Home (Enter Location, City,State)___] : at [unfilled] [Telehealth (audio & video)] : This visit was provided via telehealth using real-time 2-way audio visual technology. [Verbal consent obtained from patient] : the patient, [unfilled] [FreeTextEntry8] : CP Mustapha Daughter Alejandra 77 yo female tonsillar cancer s/p surgery, now on RT (completed 29/30 session today, scheduled for 30th session tomorrow) for evaluation fatigue, nausea, vomiting, decreased PO intake. Is currently on oral vancomycin for treatment on c.dif. Bloodwork from 4/8/25 showed potassium level low at 3.0. Has been taking zofran with some improvement of symptoms. Denies chest symptoms or shortness of breath.  Dr. Rosa Lewis - radiation oncologist - recommended trying CP intervention at home to avoid hospitalization.   PMH: hypothyroid,  thyroid cancer (partial thyroidectomy), HTN, HLD Address: [address] [place] [address] HR: 68 BP: 140/73  RR: 12-14 O2: 95% on RA Temp: 98.3 EKG: sinus

## 2025-05-27 NOTE — PHYSICAL EXAM
[Normal] : no rashes [Laryngoscopy Performed] : laryngoscopy was performed, see procedure section for findings [de-identified] : Incision healing well, no LAD.  Posttreatment changes. [FreeTextEntry1] : Pharyngectomy site healing well.  No concerning lesions in the OC/OPx on inspection or palpation.

## 2025-05-27 NOTE — PROCEDURE
[Gag Reflex] : gag reflex preventing mirror examination [None] : none [Flexible Endoscope] : examined with the flexible endoscope [Serial Number: ___] : Serial Number: [unfilled] [de-identified] : After patient consents, a FFL is performed.  No lesions in the NPx, OPx, HPx or larynx.  Expected posttreatment changes, VC are mobile, airway patent.

## 2025-05-27 NOTE — HISTORY OF PRESENT ILLNESS
[de-identified] : 78 year old female with hx of left tonsil SCCa s/p robotic Tonsillectomy with buccal fat graft and left neck dissection on 1/15/2025 and path showed Squamous or carcinoma, predominantly non-keratinizing,  Carcinoma focally present on cauterized inferior margin, 1 out of 16 lymph nodes positive for metastatic carcinoma. completed RT 4/23/2025 Pt is here for f/u and doing well, mild dry mouth, tolerates po well, soft diet. no pain wt 149 lbs pt wants to get physical therapy for legs muscle weakness Denies dyspnea, dysphagia, odynophagia, fevers. Reports 54 pounds weight loss during the course of treatment. Occasional difficulty with swallowing both solid and liquids. States supplementing with shakes. Denies dyspnea, odynophagia, hemoptysis.

## 2025-05-27 NOTE — PROCEDURE
[Gag Reflex] : gag reflex preventing mirror examination [None] : none [Flexible Endoscope] : examined with the flexible endoscope [Serial Number: ___] : Serial Number: [unfilled] [de-identified] : After patient consents, a FFL is performed.  No lesions in the NPx, OPx, HPx or larynx.  Expected posttreatment changes, VC are mobile, airway patent.

## 2025-05-27 NOTE — PHYSICAL EXAM
[Normal] : no rashes [Laryngoscopy Performed] : laryngoscopy was performed, see procedure section for findings [de-identified] : Incision healing well, no LAD.  Posttreatment changes. [FreeTextEntry1] : Pharyngectomy site healing well.  No concerning lesions in the OC/OPx on inspection or palpation.

## 2025-05-27 NOTE — HISTORY OF PRESENT ILLNESS
[de-identified] : 78 year old female with hx of left tonsil SCCa s/p robotic Tonsillectomy with buccal fat graft and left neck dissection on 1/15/2025 and path showed Squamous or carcinoma, predominantly non-keratinizing,  Carcinoma focally present on cauterized inferior margin, 1 out of 16 lymph nodes positive for metastatic carcinoma. completed RT 4/23/2025 Pt is here for f/u and doing well, mild dry mouth, tolerates po well, soft diet. no pain wt 149 lbs pt wants to get physical therapy for legs muscle weakness Denies dyspnea, dysphagia, odynophagia, fevers. Reports 54 pounds weight loss during the course of treatment. Occasional difficulty with swallowing both solid and liquids. States supplementing with shakes. Denies dyspnea, odynophagia, hemoptysis.

## 2025-05-30 NOTE — PHYSICAL EXAM
[Sclera] : the sclera and conjunctiva were normal [] : no respiratory distress [Heart Rate And Rhythm] : heart rate and rhythm were normal [Abdomen Soft] : soft [Nondistended] : nondistended [Abdomen Tenderness] : non-tender [Normal] : normal spine exam without palpable tenderness, no kyphosis or scoliosis [Musculoskeletal - Swelling] : no joint swelling [Skin Color & Pigmentation] : normal skin color and pigmentation [No Focal Deficits] : no focal deficits [Oriented To Time, Place, And Person] : oriented to person, place, and time

## 2025-05-30 NOTE — HISTORY OF PRESENT ILLNESS
[FreeTextEntry1] : Ms. Villalobos is a 78 year old F with Stage I [id number] SCC of left palatine tonsil (p16+/HPV+) s/p TORs and ipsilateral neck dissection on 1/15/25. Pathology yielded SCC, 3.0 cm in left tonsil, focally present on inferior margin but supplemental margin negative, 2/28 lymph nodes (largest measuring 1.2cm), -LVSI, -PNI. 4/23/2025 She completed radiation therapy to a total dose of 6000cGy in 30 fractions to the Oropharynx/Left neck.  5/30/2025 She presents for post treatment evaluation. While on treatment, she developed nausea and vomiting, intolerance to po intake with intake and ability to take po medications, fatigue, weight loss, received IVF @ home  She had chronic diarrhea pretreatment, diagnosed with CDiff, managed with antibiotics by Dr West ([facility]) She was started on K+ replacement for hypokalemia and referred for medical management. Today, appetite is improving, tolerating regular diet and supplementing with ensure. Taste is returning, dry mouth is improved. Skin is healed. She reports resolution of Cdiff diarrhea Follows Dr Ramos PCP @ [facility] Followed up with Dr Ibarra on 5/27/25. SLP for swallow rehab. PET and CT STN scheduled for 7/30/25

## 2025-05-30 NOTE — HISTORY OF PRESENT ILLNESS
[FreeTextEntry1] : Ms. 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 Pathology yielded SCC, 3.0 cm in left tonsil, focally present on inferior margin but supplemental margin negative, 2/28 lymph nodes (largest measuring 1.2cm), -LVSI, -PNI. 4/23/2025 She completed radiation therapy to a total dose of 6000cGy in 30 fractions to the Oropharynx/Left neck.  5/30/2025 She presents for post treatment evaluation. While on treatment, she developed nausea and vomiting, intolerance to po intake with intake and ability to take po medications, fatigue, weight loss, received IVF @ home  She had chronic diarrhea pretreatment, diagnosed with CDiff, managed with antibiotics by Dr West ([facility]) She was started on K+ replacement for hypokalemia and referred for medical management. Today, appetite is improving, tolerating regular diet and supplementing with ensure. Taste is returning, dry mouth is improved. Skin is healed. She reports resolution of Cdiff diarrhea Follows Dr Ramos PCP @ [facility] Followed up with Dr Ibarra on 5/27/25. SLP for swallow rehab. PET and CT STN scheduled for 7/30/25

## 2025-05-30 NOTE — REVIEW OF SYSTEMS
[Xerostomia: Grade 1 - Symptomatic (e.g., dry or thick saliva) without significant dietary alteration; unstimulated saliva flow >0.2 ml/min] : Xerostomia: Grade 1 - Symptomatic (e.g., dry or thick saliva) without significant dietary alteration; unstimulated saliva flow >0.2 ml/min [Dysgeusia: Grade 1- Altered taste but no change in diet] : Dysgeusia: Grade 1 - Altered taste but no change in diet

## 2025-05-30 NOTE — VITALS
[Maximal Pain Intensity: 1/10] : 1/10 [Pain Duration: ___] : Pain duration: [unfilled] [Pain Location: ___] : Pain Location: [unfilled] [80: Normal activity with effort; some signs or symptoms of disease.] : 80: Normal activity with effort; some signs or symptoms of disease.